# Patient Record
Sex: FEMALE
[De-identification: names, ages, dates, MRNs, and addresses within clinical notes are randomized per-mention and may not be internally consistent; named-entity substitution may affect disease eponyms.]

---

## 2019-03-17 ENCOUNTER — HOSPITAL ENCOUNTER (EMERGENCY)
Dept: HOSPITAL 25 - UCCORT | Age: 39
Discharge: HOME | End: 2019-03-17
Payer: COMMERCIAL

## 2019-03-17 VITALS — SYSTOLIC BLOOD PRESSURE: 152 MMHG | DIASTOLIC BLOOD PRESSURE: 90 MMHG

## 2019-03-17 DIAGNOSIS — M77.01: Primary | ICD-10-CM

## 2019-03-17 DIAGNOSIS — F17.210: ICD-10-CM

## 2019-03-17 PROCEDURE — 99212 OFFICE O/P EST SF 10 MIN: CPT

## 2019-03-17 PROCEDURE — G0463 HOSPITAL OUTPT CLINIC VISIT: HCPCS

## 2019-12-09 ENCOUNTER — HOSPITAL ENCOUNTER (OUTPATIENT)
Dept: HOSPITAL 25 - OR | Age: 39
Discharge: HOME | End: 2019-12-09
Attending: ORTHOPAEDIC SURGERY
Payer: COMMERCIAL

## 2019-12-09 VITALS — SYSTOLIC BLOOD PRESSURE: 126 MMHG | DIASTOLIC BLOOD PRESSURE: 88 MMHG

## 2019-12-09 DIAGNOSIS — F17.210: ICD-10-CM

## 2019-12-09 DIAGNOSIS — G56.21: Primary | ICD-10-CM

## 2019-12-09 PROCEDURE — 81025 URINE PREGNANCY TEST: CPT

## 2019-12-09 NOTE — OP
DATE OF OPERATION:  12/09/19 - [facility]

 

DATE OF BIRTH:  [birth year]

 

SURGEON:  Bill Thurman MD

 

ASSISTANT:  KAELYN Bauer.  An assistant was needed for positioning of the arm 
and retraction.

 

ANESTHESIOLOGIST:  Dr. Vigil.

 

ANESTHESIA:  General.

 

PRE-OP DIAGNOSIS:  Right ulnar nerve compression at the elbow.

 

POST-OP DIAGNOSIS:  Right ulnar nerve compression at the elbow.

 

OPERATIVE PROCEDURE:  Right ulnar nerve decompression with anterior 
transmuscular transposition.

 

INDICATIONS:  Ms. Crisostomo is 39 years old.  She has a lot of tension signs and a 
lot of neuritis at the elbow.  We had talked about treatment options.  She 
wanted to proceed with surgery.  She understands those risks and benefits 
including the risk of persistent symptoms amongst others.

 

ESTIMATED BLOOD LOSS:  5 mL.

 

COMPLICATIONS:  None.

 

FINDINGS:  See above and below.

 

DESCRIPTION OF PROCEDURE:  Ms. Crisostomo was seen in the preoperative holding area.
  The correct site, side, and procedure were identified.  We came back to the 
operating room.  The arm was prepped and draped in usual fashion and a time-out 
was performed.

 

The arm was exsanguinated with the Esmarch and the tourniquet was inflated to 
250 mmHg.  I made a curvilinear incision centered over the cubital tunnel.  
Dissection was carried down through the subcutaneous tissue.  The medial 
antebrachial cutaneous nerve was preserved throughout the procedure.  I went 
ahead and unroofed the nerve by releasing the fascia just proximal to Gerber's 
ligament, and appendiceal retractor was placed proximally and the release was 
carried out past the arcade of [place].  I then came distally and released 
Gerber's ligament and released the superficial FCU fascia.  I split the 2 
heads of the FCU and released the subfascial layer.  At this point, the nerve 
was subluxating over the medial epicondyle.  I therefore excised the medial 
intermuscular septum.  I excised the leading edge of the FCU fascia.  I raised 
up step-cut fascial flaps in the flexor pronator fascia and excised the 
muscular septi.  A vessel loop was placed around the nerve and tenolysis was 
performed.  The nerve was then transposed up onto the muscular bed and the 2 
ends of the fascial flaps were sewn end-to-end to provide a loose fascial 
sleeve to keep the nerve in the transposed position.  At this point, everything 
was looking very good.  Hemostasis was obtained with the Bovie and the bipolar.
  The wound was irrigated out, subcutaneous tissue was reapproximated with 3-0 
Vicryl suture.  Skin was closed with 3-0 Monocryl and Steri-Strips.  A 0.25% 
Marcaine was infiltrated all about the area.  The wound was dressed with 4x4s, 
sterile Webril, and ABD at the elbow and then a long-arm splint with lateral 
buttress was applied.  She was taken to the recovery room in stable condition.

 

 [id number] #: [id number]

MILLY

## 2023-09-19 NOTE — UC
Elbow Pain





- HPI Summary


HPI Summary: 





37 yo female who works as a .  She is right handed.


Has had medial right elbow pain x months (6+)


Pain is constant


It is exacerbated by painting or spackling


worse past few days


tried a tennis elbow brace that did not help





no neck pain











- History of Current Complaint


Chief Complaint: UCUpperExtremity


Stated Complaint: RIGHT ELBOW/ARM COMPLAINT (WC)


Time Seen by Provider: 03/17/19 09:44


Hx Obtained From: Patient


Hx Last Menstrual Period: 03/07/19


Mechanism of Injury: painting/spackling (she suspect an overuse injury)


Onset/Duration: Weeks - mos


Severity Initially: Mild


Severity Currently: Mild


Pain Intensity: 3 - worse with use


Pain Scale Used: 0-10 Numeric


Location Of Pain: Is Discrete @ - medial elbow, Radiates To - burning pain 

radiates to medial forearm


Aggravating Factor(s): Movement, Pulling, Twisting


Alleviating Factor(s): Rest


Associated Signs And Symptoms: Positive: Numbness/Tingling.  Negative: Swelling

, Redness, Bruising, Fever, Weakness


Body - Head: 


  __________________________














  __________________________





 1 - pain here (tender medial epicondyle)





 2 - burning pain








- Allergies/Home Medications


Allergies/Adverse Reactions: 


 Allergies











Allergy/AdvReac Type Severity Reaction Status Date / Time


 


No Known Allergies Allergy   Verified 03/17/19 10:28














PMH/Surg Hx/FS Hx/Imm Hx


Previously Healthy: Yes





- Surgical History


Surgical History: None





- Family History


Known Family History: Positive: Other - mom and sister have LUPUS





- Social History


Alcohol Use: Weekly


Substance Use Type: None


Smoking Status (MU): Heavy Every Day Tobacco Smoker


Type: Cigarettes


Amount Used/How Often: 1 ppd





Review of Systems


All Other Systems Reviewed And Are Negative: Yes


Constitutional: Positive: Negative


Skin: Positive: Negative


Eyes: Positive: Negative


ENT: Positive: Negative


Respiratory: Positive: Negative


Cardiovascular: Positive: Negative


Gastrointestinal: Positive: Negative


Genitourinary: Positive: Negative


Motor: Positive: Negative


Neurovascular: Positive: Negative


Musculoskeletal: Positive: Arthralgia


Neurological: Positive: Negative


Psychological: Positive: Negative





Physical Exam


Triage Information Reviewed: Yes


Appearance: Well-Appearing, No Pain Distress, Well-Nourished


Vital Signs: 


 Initial Vital Signs











Temp  98.4 F   03/17/19 09:38


 


Pulse  101   03/17/19 09:38


 


Resp  16   03/17/19 09:38


 


BP  152/90   03/17/19 09:38


 


Pulse Ox  100   03/17/19 09:38











Vital Signs Reviewed: Yes


Eyes: Positive: Conjunctiva Clear


ENT: Positive: Hearing grossly normal, Uvula midline.  Negative: Nasal 

congestion, Nasal drainage, Tonsillar swelling, Tonsillar exudate, Trismus, 

Hoarse voice


Neck: Positive: Supple, Nontender, No Lymphadenopathy


Respiratory: Positive: Lungs clear, Normal breath sounds, No respiratory 

distress


Cardiovascular: Positive: RRR, No Murmur


Musculoskeletal: Positive: ROM Intact, No Edema, Other: - see image


Neurological: Positive: Alert


Psychological Exam: Normal


Skin Exam: Normal





Elbow Pain Course/Dx





- Differential Dx/Diagnosis


Provider Diagnosis: 


 Medial epicondylitis, right elbow








Discharge





- Sign-Out/Discharge


Documenting (check all that apply): Patient Departure


All imaging exams completed and their final reports reviewed: Yes





- Discharge Plan


Condition: Stable


Disposition: HOME


Prescriptions: 


Naproxen [Naprosyn 500 mg tab] 500 mg PO BID PRN #30 tablet


 PRN Reason: Pain


Patient Education Materials:  Tendinitis (ED)


Referrals: 


No Primary Care Phys,NOPCP [Primary Care Provider] - 


Stefano Ge MD [Medical Doctor] - 1 Week


Giovany Acosta MD [Medical Doctor] - If Needed


Additional Instructions: 


heat twice daily








your BP was a little high here





I suggest you find a local MD to follow it





try to get rechecked for BP in 2-12 weeks Pt placed in sling. Tolerated well.       Garry Wadr RN  09/18/23 [id number]